# Patient Record
Sex: MALE | Race: BLACK OR AFRICAN AMERICAN | NOT HISPANIC OR LATINO | Employment: FULL TIME | URBAN - METROPOLITAN AREA
[De-identification: names, ages, dates, MRNs, and addresses within clinical notes are randomized per-mention and may not be internally consistent; named-entity substitution may affect disease eponyms.]

---

## 2023-01-18 ENCOUNTER — HOSPITAL ENCOUNTER (EMERGENCY)
Facility: CLINIC | Age: 28
Discharge: HOME | End: 2023-01-18
Attending: PHYSICIAN ASSISTANT | Admitting: PHYSICIAN ASSISTANT

## 2023-01-18 ENCOUNTER — NURSE TRIAGE (OUTPATIENT)
Dept: NURSING | Facility: CLINIC | Age: 28
End: 2023-01-18

## 2023-01-18 VITALS
OXYGEN SATURATION: 100 % | RESPIRATION RATE: 16 BRPM | DIASTOLIC BLOOD PRESSURE: 73 MMHG | SYSTOLIC BLOOD PRESSURE: 142 MMHG | WEIGHT: 175 LBS | HEIGHT: 75 IN | BODY MASS INDEX: 21.76 KG/M2 | HEART RATE: 83 BPM | TEMPERATURE: 98.4 F

## 2023-01-18 DIAGNOSIS — N34.2 URETHRITIS: ICD-10-CM

## 2023-01-18 DIAGNOSIS — N48.89 SORE OF PENIS: ICD-10-CM

## 2023-01-18 DIAGNOSIS — Z11.3 SCREEN FOR STD (SEXUALLY TRANSMITTED DISEASE): ICD-10-CM

## 2023-01-18 DIAGNOSIS — R36.9 PENILE DISCHARGE: ICD-10-CM

## 2023-01-18 PROCEDURE — 99204 OFFICE O/P NEW MOD 45 MIN: CPT | Performed by: PHYSICIAN ASSISTANT

## 2023-01-18 PROCEDURE — 87591 N.GONORRHOEAE DNA AMP PROB: CPT | Performed by: PHYSICIAN ASSISTANT

## 2023-01-18 PROCEDURE — 250N000009 HC RX 250: Performed by: PHYSICIAN ASSISTANT

## 2023-01-18 PROCEDURE — 96372 THER/PROPH/DIAG INJ SC/IM: CPT | Performed by: PHYSICIAN ASSISTANT

## 2023-01-18 PROCEDURE — G0463 HOSPITAL OUTPT CLINIC VISIT: HCPCS | Performed by: PHYSICIAN ASSISTANT

## 2023-01-18 PROCEDURE — 250N000011 HC RX IP 250 OP 636: Performed by: PHYSICIAN ASSISTANT

## 2023-01-18 PROCEDURE — 87491 CHLMYD TRACH DNA AMP PROBE: CPT | Performed by: PHYSICIAN ASSISTANT

## 2023-01-18 PROCEDURE — 87529 HSV DNA AMP PROBE: CPT | Performed by: PHYSICIAN ASSISTANT

## 2023-01-18 RX ORDER — DOXYCYCLINE 100 MG/1
100 CAPSULE ORAL 2 TIMES DAILY
Qty: 14 CAPSULE | Refills: 0 | Status: SHIPPED | OUTPATIENT
Start: 2023-01-18 | End: 2023-01-25

## 2023-01-18 RX ORDER — VALACYCLOVIR HYDROCHLORIDE 1 G/1
1000 TABLET, FILM COATED ORAL 2 TIMES DAILY
Qty: 20 TABLET | Refills: 0 | Status: SHIPPED | OUTPATIENT
Start: 2023-01-18 | End: 2023-01-28

## 2023-01-18 RX ADMIN — LIDOCAINE HYDROCHLORIDE 500 MG: 10 INJECTION, SOLUTION EPIDURAL; INFILTRATION; INTRACAUDAL; PERINEURAL at 21:01

## 2023-01-18 ASSESSMENT — ENCOUNTER SYMPTOMS
CONSTITUTIONAL NEGATIVE: 1
DYSURIA: 1

## 2023-01-18 ASSESSMENT — ACTIVITIES OF DAILY LIVING (ADL): ADLS_ACUITY_SCORE: 35

## 2023-01-19 ENCOUNTER — TELEPHONE (OUTPATIENT)
Dept: EMERGENCY MEDICINE | Facility: CLINIC | Age: 28
End: 2023-01-19

## 2023-01-19 LAB
C TRACH DNA SPEC QL NAA+PROBE: POSITIVE
HSV1 DNA SPEC QL NAA+PROBE: NOT DETECTED
HSV2 DNA SPEC QL NAA+PROBE: DETECTED
N GONORRHOEA DNA SPEC QL NAA+PROBE: POSITIVE

## 2023-01-19 NOTE — TELEPHONE ENCOUNTER
Burning with urination, also urinary frequency, Onset symptoms 2 weeks ago. Suspects STI. Is here from [place] working.  Some Left sided back pain comes and goes.  No blood in urine. Afebrile.  Has open sore on shaft of penis. Clear drainage from sore. White discharge from end of penis also. Voiding small amounts and bladder feels full still after voiding.     Protocol reviewed, To ED now, call back with worsening symptoms, further questions/concerns.     SONYA RUTHERFORD RN  [facility] nurse advisors  1/18/2023  7:34 PM      Reason for Disposition    [1] Unable to urinate (or only a few drops) > 4 hours AND [2] bladder feels very full (e.g., palpable bladder or strong urge to urinate)    Additional Information    Negative: [1] Blood from end of penis AND [2] large amount    Negative: [1] Not circumcised AND [2] foreskin pulled back and stuck    Negative: [1] Looks infected (e.g., draining sore, ulcer, rash is painful to touch) AND [2] fever > 100.4 F (38.0 C)    Protocols used: PENIS AND SCROTUM SYMPTOMS-A-AH

## 2023-01-19 NOTE — TELEPHONE ENCOUNTER
"[facility] Emergency Department/Urgent Care Lab result notification:    [place] ED lab result protocol used  Herpes Simplex  N. Gonorrhea  Chlamydia    Reason for call  Notify of lab results, assess symptoms,  review ED providers recommendations/discharge instructions (if necessary) and advise per ED lab result f/u protocol    Lab Result   Final result for Herpes Simplex Virus 1 PCR is [NEGATIVE] and Herpes Simplex Virus 2 PCR is [POSITIVE].    Specimen information:  penis swab  [facility] Emergency Dept discharge antiviral medication (if prescribed): valACYclovir (VALTREX) 1000 mg tablet, Take 1 tablet (1,000 mg) by mouth 2 times daily for 10 days  Recommendations in Treatment per [facility] ED Lab Result Herpes Simplex Virus Protocol    Final N. Gonorrhoeae PCR is [POSITIVE] AND Chlamydia T PCR is [POSITIVE]  Patient was treated for N. Gonorrhea AND/OR Chlamydia T in the [facility] Emergency Dept  [Yes or No]:  Yes       If Yes, list what was given in the ED:  Ceftriaxone (Rocephin) 500 mg IM x 1   [facility] Emergency Dept discharge antibiotic (if prescribed): Doxycycline 100 mg PO tablet, 1 tablet (100 mg) by mouth 2 times daily for 7 days  Recommendations in treatment per [facility] ED lab result Chlamydia T. AND/OR N. Gonorrhea protocol    Information table from Emergency Dept Provider visit on 1/18/23  Symptoms reported at ED visit (Chief complaint, HPI) STD        Dysuria, discharge from penis, patient states he had a \"scratch\" on penis and now it is spreading and not healing. Symptoms for three weeks      HPI  Jesse Fitzpatrick is a 27 year old male who presents with complaints of penile discharge, dysuria, and penile sore.  Patient reports clear drainage from the sore on his penis.  Patient states initially it looked like he had a \"scratch\" to the shaft of his penis that has since grown in size.  The sore is painful.  He also has white penile discharge.  " "Patient states symptoms have been progressive over the past 3 weeks.  He is concerned about STDs.  Denies fevers, chills, nausea, vomiting, abdominal pain, back or flank pain, or hematuria.  Patient denies history of STDs in the past.  He states he is in town from [place] for work.     ED providers Impression and Plan (applicable information)    Pt is a 27 year old male who presents with complaints of penile discharge, dysuria, and penile sore.  Patient reports clear drainage from the sore on his penis.  Patient states initially it looked like he had a \"scratch\" to the shaft of his penis that has since grown in size.  The sore is painful.  He also has white penile discharge.  Patient states symptoms have been progressive over the past 3 weeks.  He is concerned about STDs.       Pt is afebrile on arrival.  Exam as above.  Patient is noted to have a tender ulcerative lesion along the left side of the shaft of his penis.  Concern for HSV infection.  This skin lesion was swabbed and sent for HSV testing.  He is also noted to have tender left inguinal lymphadenopathy.  Gonorrhea and Chlamydia PCR's were obtained and are currently pending.  Patient was treated for presumed urethritis with IM Rocephin and will send with oral Doxycycline.  Also presumptively treated for HSV infection with Valtrex given physical exam.  Return precautions were reviewed.  Hand-outs were provided.     Patient was instructed to follow-up with PCP in 3-5 days for continued care and management or sooner if new or worsening symptoms.  He is to return to the ED for persistent and/or worsening symptoms.  Patient expressed understanding of the diagnosis and plan and was discharged home in good condition.   Miscellaneous information na     RN Assessment (Patient s current Symptoms), include time called.  [Insert Left message here if message left]  11:47AM: Patient states he is feeling better. No difficulty urinating.   RN Recommendations/Instructions " per [place] ED lab result protocol  Patient notified of lab result and treatment recommendations.    RN reviewed information about N. Gonorrhea and Chlamydia and HSV from protocol patient education.     STD Patient Instructions:    We recommend that you contact any recent sexual partners within the last 2 months and have them evaluated by a physician.    Avoid sexual activity for 7 to 10 days or until both you and your partner(s) have completed all antibiotic medications.    We advise that you consider following up with your PCP at approximately 3 months for retesting to be sure the infection has cleared.    Information about Herpes Simplex virus reviewed with Patient:     If antiviral medication prescribed, Patient advised to follow-up with PCP after completing treatment.     Initiation of oral antiviral therapy within 72 hours of lesion appearance may decrease duration and severity of illness by days to weeks.    Encourage patient to contact PCP clinic if symptoms worsen or other concerning symptoms.  Patient can always consider returning to the ED if symptoms are worse or other concerning symptoms.    Genital Herpes - Summary points below:  o Genital herpes is a viral infection that is spread during sex.  o Symptoms of genital herpes include blisters in the genital area (eg, penis, buttocks, anus, vulva). The blisters become painful ulcers. Some people have no symptoms at all.  o Symptoms are usually most severe when they first appear. Outbreaks usually become less intense and less frequent over time. Most people have an outbreak of genital herpes more than once in their life. The frequency of these outbreaks varies from individual to individual.  o It is possible to spread herpes even if there are no visible ulcers. It is not possible to catch herpes by touching a surface (door knobs, toilet seat, bed sheets).  o Antiviral medications help to speed healing of ulcers in people who have just been infected or in  those who are having repeat outbreaks.   o Some people who have herpes outbreaks take medicine every day to prevent future outbreaks or prevent spread to their sex partner.  o There are ways to lower the risk of being infected with genital herpes. People should use a latex condom every time they have sex. Sex (oral, vaginal, and anal) is not recommended if a person has blisters or ulcers.    Advised to follow up with the PCP as directed by the ED provider and to return to ED with any worsening symptoms.   The patient is comfortable with the information given and has no further questions.       Please Contact your PCP clinic or return to the Emergency department if your:    Symptoms worsen or other concerning symptom's.    PCP follow-up Questions asked: YES       Daphne Martinez RN  [facility]  Emer[facility] Lab Result RN  Ph# [phone]     Copy of Lab result

## 2023-01-19 NOTE — ED PROVIDER NOTES
"  History     Chief Complaint   Patient presents with     STD     Dysuria, discharge from penis, patient states he had a \"scratch\" on penis and now it is spreading and not healing. Symptoms for three weeks     HPI  Jesse Fitzpatrick is a 27 year old male who presents with complaints of penile discharge, dysuria, and penile sore.  Patient reports clear drainage from the sore on his penis.  Patient states initially it looked like he had a \"scratch\" to the shaft of his penis that has since grown in size.  The sore is painful.  He also has white penile discharge.  Patient states symptoms have been progressive over the past 3 weeks.  He is concerned about STDs.  Denies fevers, chills, nausea, vomiting, abdominal pain, back or flank pain, or hematuria.  Patient denies history of STDs in the past.  He states he is in town from [place] for work.      Allergies:  No Known Allergies    Problem List:    There are no problems to display for this patient.       Past Medical History:    No past medical history on file.    Past Surgical History:    No past surgical history on file.    Family History:    No family history on file.    Social History:  Marital Status:  Single [1]        Medications:    doxycycline hyclate (VIBRAMYCIN) 100 MG capsule  valACYclovir (VALTREX) 1000 mg tablet          Review of Systems   Constitutional: Negative.    Genitourinary: Positive for dysuria, genital sores and penile discharge.   All other systems reviewed and are negative.      Physical Exam   BP: (!) 142/73  Pulse: 83  Temp: 98.4  F (36.9  C)  Resp: 16  Height: 190.5 cm (6' 3\")  Weight: 79.4 kg (175 lb)  SpO2: 100 %      Physical Exam  Constitutional:       General: He is not in acute distress.     Appearance: Normal appearance. He is well-developed. He is not ill-appearing, toxic-appearing or diaphoretic.   HENT:      Head: Normocephalic and atraumatic.   Eyes:      Conjunctiva/sclera: Conjunctivae normal.   Cardiovascular:      Pulses: Normal " "pulses.   Pulmonary:      Effort: Pulmonary effort is normal.   Abdominal:      Palpations: Abdomen is soft.   Genitourinary:     Penis: Tenderness, discharge and lesions present.       Testes:         Right: Tenderness or swelling not present.         Left: Tenderness or swelling not present.      Comments: Shallow ulcerative tender lesion to left side of shaft of penis  Musculoskeletal:         General: Normal range of motion.      Cervical back: Neck supple.   Lymphadenopathy:      Lower Body: Left inguinal adenopathy present.   Skin:     General: Skin is warm and dry.      Capillary Refill: Capillary refill takes less than 2 seconds.   Neurological:      Mental Status: He is alert.      Sensory: Sensation is intact.      Motor: Motor function is intact.         ED Course                 Procedures    No results found for this or any previous visit (from the past 24 hour(s)).    Medications   cefTRIAXone (ROCEPHIN) 500 mg in lidocaine injection (has no administration in time range)       Assessments & Plan (with Medical Decision Making)     Pt is a 27 year old male who presents with complaints of penile discharge, dysuria, and penile sore.  Patient reports clear drainage from the sore on his penis.  Patient states initially it looked like he had a \"scratch\" to the shaft of his penis that has since grown in size.  The sore is painful.  He also has white penile discharge.  Patient states symptoms have been progressive over the past 3 weeks.  He is concerned about STDs.      Pt is afebrile on arrival.  Exam as above.  Patient is noted to have a tender ulcerative lesion along the left side of the shaft of his penis.  Concern for HSV infection.  This skin lesion was swabbed and sent for HSV testing.  He is also noted to have tender left inguinal lymphadenopathy.  Gonorrhea and Chlamydia PCR's were obtained and are currently pending.  Patient was treated for presumed urethritis with IM Rocephin and will send with oral " Doxycycline.  Also presumptively treated for HSV infection with Valtrex given physical exam.  Return precautions were reviewed.  Hand-outs were provided.    Patient was instructed to follow-up with PCP in 3-5 days for continued care and management or sooner if new or worsening symptoms.  He is to return to the ED for persistent and/or worsening symptoms.  Patient expressed understanding of the diagnosis and plan and was discharged home in good condition.    I have reviewed the nursing notes.    I have reviewed the findings, diagnosis, plan and need for follow up with the patient.      New Prescriptions    DOXYCYCLINE HYCLATE (VIBRAMYCIN) 100 MG CAPSULE    Take 1 capsule (100 mg) by mouth 2 times daily for 7 days    VALACYCLOVIR (VALTREX) 1000 MG TABLET    Take 1 tablet (1,000 mg) by mouth 2 times daily for 10 days       Final diagnoses:   Screen for STD (sexually transmitted disease)   Penile discharge   Sore of penis   Urethritis       1/18/2023   [facility] EMERGENCY DEPT      Disclaimer:  This note consists of symbols derived from keyboarding, dictation and/or voice recognition software.  As a result, there may be errors in the script that have gone undetected.  Please consider this when interpreting information found in this chart.     Heather Vasquez PA-C  01/18/23 2100

## 2023-03-27 ENCOUNTER — HOSPITAL ENCOUNTER (EMERGENCY)
Facility: CLINIC | Age: 28
Discharge: HOME | End: 2023-03-27
Attending: PHYSICIAN ASSISTANT | Admitting: PHYSICIAN ASSISTANT

## 2023-03-27 VITALS
OXYGEN SATURATION: 100 % | HEART RATE: 57 BPM | RESPIRATION RATE: 18 BRPM | TEMPERATURE: 98.4 F | DIASTOLIC BLOOD PRESSURE: 87 MMHG | SYSTOLIC BLOOD PRESSURE: 139 MMHG

## 2023-03-27 DIAGNOSIS — B34.9 VIRAL ILLNESS: ICD-10-CM

## 2023-03-27 DIAGNOSIS — R07.0 THROAT PAIN: ICD-10-CM

## 2023-03-27 DIAGNOSIS — Z20.822 EXPOSURE TO 2019 NOVEL CORONAVIRUS: ICD-10-CM

## 2023-03-27 LAB
DEPRECATED S PYO AG THROAT QL EIA: NEGATIVE
FLUAV RNA SPEC QL NAA+PROBE: NEGATIVE
FLUBV RNA RESP QL NAA+PROBE: NEGATIVE
GROUP A STREP BY PCR: NOT DETECTED
RSV RNA SPEC NAA+PROBE: NEGATIVE
SARS-COV-2 RNA RESP QL NAA+PROBE: NEGATIVE

## 2023-03-27 PROCEDURE — 87651 STREP A DNA AMP PROBE: CPT | Performed by: PHYSICIAN ASSISTANT

## 2023-03-27 PROCEDURE — 99213 OFFICE O/P EST LOW 20 MIN: CPT | Mod: CS | Performed by: PHYSICIAN ASSISTANT

## 2023-03-27 PROCEDURE — 87637 SARSCOV2&INF A&B&RSV AMP PRB: CPT | Performed by: PHYSICIAN ASSISTANT

## 2023-03-27 PROCEDURE — C9803 HOPD COVID-19 SPEC COLLECT: HCPCS | Performed by: PHYSICIAN ASSISTANT

## 2023-03-27 PROCEDURE — G0463 HOSPITAL OUTPT CLINIC VISIT: HCPCS | Mod: CS | Performed by: PHYSICIAN ASSISTANT

## 2023-03-27 ASSESSMENT — ENCOUNTER SYMPTOMS
GASTROINTESTINAL NEGATIVE: 1
CARDIOVASCULAR NEGATIVE: 1
HEADACHES: 1
PSYCHIATRIC NEGATIVE: 1
COUGH: 1
EYES NEGATIVE: 1
SHORTNESS OF BREATH: 1
FATIGUE: 1
SORE THROAT: 1
FEVER: 1
MYALGIAS: 1
RHINORRHEA: 1

## 2023-03-27 NOTE — ED PROVIDER NOTES
History     Chief Complaint   Patient presents with     Pharyngitis     Shortness of Breath     HPI  Jesse Fitzpatrick is a 28 year old male who presents today with concerns for covid exposure and URI symptoms that started this morning. Patient states, dry cough, runny nose/congestion, sore throat, headache, myalgias, some shortness of breath with exertion or coughing. Patient has not taken anything for symptoms today and is not vaccinated against covid 19. Patient denies chest pain, abdominal pain, nausea/vomiting, diarrhea, or rash. Patient denies tobacco or drug use.    Allergies:  No Known Allergies    Problem List:    There are no problems to display for this patient.       Past Medical History:    No past medical history on file.    Past Surgical History:    No past surgical history on file.    Family History:    No family history on file.    Social History:  Marital Status:  Single [1]        Medications:    valACYclovir (VALTREX) 1000 mg tablet          Review of Systems   Constitutional: Positive for fatigue and fever.   HENT: Positive for congestion, rhinorrhea and sore throat.    Eyes: Negative.    Respiratory: Positive for cough and shortness of breath.    Cardiovascular: Negative.    Gastrointestinal: Negative.    Genitourinary: Negative.    Musculoskeletal: Positive for myalgias.   Skin: Negative.    Neurological: Positive for headaches.   Psychiatric/Behavioral: Negative.    All other systems reviewed and are negative.      Physical Exam   BP: 139/87  Pulse: 57  Temp: 98.4  F (36.9  C)  Resp: 18  SpO2: 100 %      Physical Exam  Vitals and nursing note reviewed.   Constitutional:       General: He is not in acute distress.     Appearance: He is well-developed and normal weight. He is ill-appearing. He is not toxic-appearing or diaphoretic.   HENT:      Right Ear: Tympanic membrane and ear canal normal.      Left Ear: Tympanic membrane and ear canal normal.      Nose: Congestion and rhinorrhea present.       Mouth/Throat:      Mouth: Mucous membranes are moist. No oral lesions.      Pharynx: Posterior oropharyngeal erythema (minimal) present. No pharyngeal swelling, oropharyngeal exudate or uvula swelling.   Eyes:      General: No scleral icterus.     Extraocular Movements: Extraocular movements intact.      Conjunctiva/sclera: Conjunctivae normal.      Pupils: Pupils are equal, round, and reactive to light.   Neck:      Thyroid: No thyromegaly.   Cardiovascular:      Rate and Rhythm: Normal rate and regular rhythm.      Heart sounds: Normal heart sounds.   Pulmonary:      Effort: Pulmonary effort is normal.      Breath sounds: Normal breath sounds.   Abdominal:      Palpations: Abdomen is soft.      Tenderness: There is no abdominal tenderness.   Musculoskeletal:         General: Normal range of motion.      Cervical back: Normal range of motion and neck supple.   Lymphadenopathy:      Cervical: Cervical adenopathy present.   Skin:     General: Skin is warm.      Capillary Refill: Capillary refill takes less than 2 seconds.   Neurological:      General: No focal deficit present.      Mental Status: He is alert and oriented to person, place, and time.   Psychiatric:         Mood and Affect: Mood normal.         Behavior: Behavior normal.         Thought Content: Thought content normal.         Judgment: Judgment normal.         ED Course                 Procedures             Critical Care time:  none               Results for orders placed or performed during the hospital encounter of 03/27/23 (from the past 24 hour(s))   Symptomatic Influenza A/B, RSV, & SARS-CoV2 PCR (COVID-19) Nasopharyngeal    Specimen: Nasopharyngeal; Swab   Result Value Ref Range    Influenza A PCR Negative Negative    Influenza B PCR Negative Negative    RSV PCR Negative Negative    SARS CoV2 PCR Negative Negative    Narrative    Testing was performed using the Xpert Xpress CoV2/Flu/RSV Assay on the Cepheid GeneXpert Instrument. This test  should be ordered for the detection of SARS-CoV-2, influenza, and RSV viruses in individuals who meet clinical and/or epidemiological criteria. Test performance is unknown in asymptomatic patients. This test is for in vitro diagnostic use under the FDA EUA for laboratories certified under CLIA to perform high or moderate complexity testing. This test has not been FDA cleared or approved. A negative result does not rule out the presence of PCR inhibitors in the specimen or target RNA in concentration below the limit of detection for the assay. If only one viral target is positive but coinfection with multiple targets is suspected, the sample should be re-tested with another FDA cleared, approved, or authorized test, if coinfection would change clinical management. This test was validated by the [facility] [employer]. These laboratories are certified under the Clinical Laboratory Improvement Amendments of 1988 (CLIA-88) as qualified to perform high complexity laboratory testing.   Streptococcus A Rapid Scr w Reflx to PCR    Specimen: Throat; Swab   Result Value Ref Range    Group A Strep antigen Negative Negative       Medications - No data to display    Assessments & Plan (with Medical Decision Making)     I have reviewed the nursing notes.    I have reviewed the findings, diagnosis, plan and need for follow up with the patient.    Jesse Fitzpatrick is a 28 year old male who presents today with concerns for covid exposure and URI symptoms that started this morning. Patient states, dry cough, runny nose/congestion, sore throat, headache, myalgias, some shortness of breath with exertion or coughing. Patient has not taken anything for symptoms today and is not vaccinated against covid 19. Patient denies chest pain, abdominal pain, nausea/vomiting, diarrhea, or rash. Patient denies tobacco or drug use.    Vitals within normal limits.  Lungs clear to auscultation bilaterally throughout.  Strep, COVID, influenza and  RSV today were all negative.  Throat culture sent and currently pending.  Recommend that patient retest for COVID-19 with an at home test in a couple of days due to recent exposure and symptoms at this time.  Discussed with patient that at this time COVID test could be a false negative due to symptoms starting today.  Symptomatic treatments discussed and patient discharged in stable condition.  No indication for further work-up or evaluation at this time.        Discharge Medication List as of 3/27/2023  6:53 PM          Final diagnoses:   Exposure to 2019 novel coronavirus   Viral illness   Throat pain       3/27/2023   [facility] EMERGENCY DEPT     Stephanie Page PA-C  03/27/23 2034

## 2023-03-27 NOTE — ED TRIAGE NOTES
Pt reports being in an airbnb this weekend and the guests tested positive for covid. PT states he is having headaches, shortness of breath, sore throat since

## 2023-03-27 NOTE — DISCHARGE INSTRUCTIONS
No antibiotic indicated at this time.  COVID influenza and RSV today were negative.  Rapid strep today was negative.  Throat culture sent and currently pending.    Could still be COVID-19 and recommend that you retest yourself in 2 to 3 days with an at home COVID test.  You can get this at any pharmacy and do not need a prescription for this.  I recommend that you pick one up today    Stay at home for the next day or 2 if symptoms persist and test for COVID prior to returning to work    Increase fluids, rest, Tylenol and ibuprofen over-the-counter as needed for symptoms, nasal saline sprays, salt water gargles, cool humidifier can also be beneficial.  You can get over-the-counter cough medication like Mucinex or Delsym.    Go to the emergency department if shortness of breath, chest pain, heart racing or skipping beats occur.